# Patient Record
Sex: MALE | Race: WHITE | NOT HISPANIC OR LATINO
[De-identification: names, ages, dates, MRNs, and addresses within clinical notes are randomized per-mention and may not be internally consistent; named-entity substitution may affect disease eponyms.]

---
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
## 2021-08-12 ENCOUNTER — APPOINTMENT (OUTPATIENT)
Dept: PEDIATRICS | Facility: CLINIC | Age: 21
End: 2021-08-12
Payer: COMMERCIAL

## 2021-08-12 VITALS — TEMPERATURE: 98 F | WEIGHT: 217 LBS

## 2021-08-12 DIAGNOSIS — S69.91XA UNSPECIFIED INJURY OF RIGHT WRIST, HAND AND FINGER(S), INITIAL ENCOUNTER: ICD-10-CM

## 2021-08-12 PROCEDURE — 99214 OFFICE O/P EST MOD 30 MIN: CPT

## 2021-08-13 NOTE — HISTORY OF PRESENT ILLNESS
[de-identified] : Fell on wrist 2 weeks ago c/o pain.  [FreeTextEntry6] : fell on right wrist, under his body weight, hyperextended\par was a bit swollen, no bruise\par has kept using, playing\par hurts only when supinates hand, only by wrist

## 2021-08-13 NOTE — PHYSICAL EXAM
[NL] : no acute distress, alert [Moves All Extremities x 4] : moves all extremities x4 [de-identified] : hands/ wrists symmetric, no bruise, swelling,  not tender to palpation, very slight decreased  strength on right, hurts slight on supination hand/ wrist

## 2021-08-13 NOTE — DISCUSSION/SUMMARY
[FreeTextEntry1] : possible wrist fx, due to injury mode, but 2 weeks ago\par xray, r/out fx\par rest hand, wrist brace, ibuprofen as needed\par consider ortho if persists, xray results

## 2021-09-17 ENCOUNTER — APPOINTMENT (OUTPATIENT)
Dept: PEDIATRICS | Facility: CLINIC | Age: 21
End: 2021-09-17